# Patient Record
Sex: MALE | Race: BLACK OR AFRICAN AMERICAN | NOT HISPANIC OR LATINO | Employment: FULL TIME | URBAN - NONMETROPOLITAN AREA
[De-identification: names, ages, dates, MRNs, and addresses within clinical notes are randomized per-mention and may not be internally consistent; named-entity substitution may affect disease eponyms.]

---

## 2023-05-30 DIAGNOSIS — Z00.00 ROUTINE GENERAL MEDICAL EXAMINATION AT A HEALTH CARE FACILITY: Primary | ICD-10-CM

## 2023-05-30 RX ORDER — FOLIC ACID 1 MG/1
1 TABLET ORAL DAILY
COMMUNITY
End: 2023-05-30 | Stop reason: SDUPTHER

## 2023-05-30 RX ORDER — FOLIC ACID 1 MG/1
1 TABLET ORAL DAILY
Qty: 90 TABLET | Refills: 1 | Status: SHIPPED | OUTPATIENT
Start: 2023-05-30 | End: 2023-08-21 | Stop reason: SDUPTHER

## 2023-06-20 NOTE — PROGRESS NOTES
Subjective   Patient ID:   Oh Espinoza is a 58 y.o. male who presents for No chief complaint on file..  HPI  Here with acute symptoms:  Symptoms x    Review of Systems  12 point review of systems negative unless stated above in HPI    There were no vitals filed for this visit.    Physical Exam  General: Alert and oriented, well nourished, no acute distress.  Lungs: Clear to auscultation, non-labored respiration.  Heart: Normal rate, regular rhythm, no murmur, gallop or edema.  Neurologic: Awake, alert, and oriented X3, CN II-XII intact.  Psychiatric: Cooperative, appropriate mood and affect.    Assessment/Plan   There are no diagnoses linked to this encounter.

## 2023-06-22 ENCOUNTER — APPOINTMENT (OUTPATIENT)
Dept: PRIMARY CARE | Facility: CLINIC | Age: 58
End: 2023-06-22
Payer: COMMERCIAL

## 2023-07-27 ENCOUNTER — TELEPHONE (OUTPATIENT)
Dept: PRIMARY CARE | Facility: CLINIC | Age: 58
End: 2023-07-27
Payer: COMMERCIAL

## 2023-07-27 DIAGNOSIS — Z00.00 ROUTINE GENERAL MEDICAL EXAMINATION AT A HEALTH CARE FACILITY: ICD-10-CM

## 2023-07-27 DIAGNOSIS — Z12.5 SCREENING PSA (PROSTATE SPECIFIC ANTIGEN): Primary | ICD-10-CM

## 2023-08-08 PROBLEM — J45.909 ASTHMA (HHS-HCC): Status: ACTIVE | Noted: 2023-08-08

## 2023-08-08 PROBLEM — E55.9 VITAMIN D DEFICIENCY: Status: ACTIVE | Noted: 2023-08-08

## 2023-08-08 PROBLEM — I26.99 PULMONARY EMBOLISM (MULTI): Status: ACTIVE | Noted: 2023-08-08

## 2023-08-08 PROBLEM — D63.8 ANEMIA, CHRONIC DISEASE: Status: ACTIVE | Noted: 2023-08-08

## 2023-08-08 NOTE — PROGRESS NOTES
Subjective   Patient ID:   Oh Espinoza is a 58 y.o. male who presents for Follow-up.  HPI  I received labs today and they look good.    Asthma:  Taking Dulera, Proventil.  No breathing problems.     Anemia of chronic disease:  Hemoglobin was normal in Aug 2023.  Has been stable the last few years.     Hx of PE:  Taking Eliquis.  Denies CP, SOB.     Vitamin D deficiency:   Taking a supplement.  This was improved in Oct 2021.      Health maintenance:  Smoking: Never a smoker.  PSA (50+): Aug 2023.  Labs: Aug 2023.  Colonoscopy (50-75): July 2019.  Influenza:     Review of Systems  12 point review of systems negative unless stated above in HPI    Vitals:    08/17/23 1537   BP: 114/75   Pulse: 68   Resp: 18   SpO2: 96%     Physical Exam  General: Alert and oriented, well nourished, no acute distress.  Lungs: Clear to auscultation, non-labored respiration.  Heart: Normal rate, regular rhythm, no murmur, gallop or edema.  Neurologic: Awake, alert, and oriented X3, CN II-XII intact.  Psychiatric: Cooperative, appropriate mood and affect.    Assessment/Plan   Labs were reviewed today.  Everything looks good!  Continue the same medications.  Chronic conditions are stable.  Call with questions or concerns.    F/u  6 months  Diagnoses and all orders for this visit:  Anemia, chronic disease  Mild intermittent asthma without complication  Pulmonary embolism without acute cor pulmonale, unspecified chronicity, unspecified pulmonary embolism type (CMS/[facility])  Vitamin D deficiency  Screening PSA (prostate specific antigen)  Routine general medical examination at a health care facility

## 2023-08-17 ENCOUNTER — OFFICE VISIT (OUTPATIENT)
Dept: PRIMARY CARE | Facility: CLINIC | Age: 58
End: 2023-08-17
Payer: COMMERCIAL

## 2023-08-17 VITALS
OXYGEN SATURATION: 96 % | DIASTOLIC BLOOD PRESSURE: 75 MMHG | WEIGHT: 257.6 LBS | HEART RATE: 68 BPM | HEIGHT: 70 IN | RESPIRATION RATE: 18 BRPM | SYSTOLIC BLOOD PRESSURE: 114 MMHG | BODY MASS INDEX: 36.88 KG/M2

## 2023-08-17 DIAGNOSIS — D63.8 ANEMIA, CHRONIC DISEASE: Primary | ICD-10-CM

## 2023-08-17 DIAGNOSIS — Z00.00 ROUTINE GENERAL MEDICAL EXAMINATION AT A HEALTH CARE FACILITY: ICD-10-CM

## 2023-08-17 DIAGNOSIS — I26.99 PULMONARY EMBOLISM WITHOUT ACUTE COR PULMONALE, UNSPECIFIED CHRONICITY, UNSPECIFIED PULMONARY EMBOLISM TYPE (MULTI): ICD-10-CM

## 2023-08-17 DIAGNOSIS — E55.9 VITAMIN D DEFICIENCY: ICD-10-CM

## 2023-08-17 DIAGNOSIS — Z12.5 SCREENING PSA (PROSTATE SPECIFIC ANTIGEN): ICD-10-CM

## 2023-08-17 DIAGNOSIS — J45.20 MILD INTERMITTENT ASTHMA WITHOUT COMPLICATION (HHS-HCC): ICD-10-CM

## 2023-08-17 PROBLEM — R19.5 OCCULT BLOOD POSITIVE STOOL: Status: ACTIVE | Noted: 2023-08-17

## 2023-08-17 PROBLEM — R05.9 COUGH: Status: RESOLVED | Noted: 2023-08-17 | Resolved: 2023-08-17

## 2023-08-17 PROBLEM — D12.8 TUBULOVILLOUS ADENOMA OF RECTUM: Status: ACTIVE | Noted: 2023-08-17

## 2023-08-17 PROBLEM — R06.02 SHORTNESS OF BREATH: Status: ACTIVE | Noted: 2023-08-17

## 2023-08-17 PROBLEM — K63.5 POLYP, COLONIC: Status: ACTIVE | Noted: 2023-08-17

## 2023-08-17 PROBLEM — I87.2 CHRONIC VENOUS INSUFFICIENCY: Status: ACTIVE | Noted: 2023-08-17

## 2023-08-17 PROBLEM — A04.8 HELICOBACTER PYLORI INFECTION: Status: ACTIVE | Noted: 2023-08-17

## 2023-08-17 PROBLEM — L40.9 PSORIASIS: Status: ACTIVE | Noted: 2023-08-17

## 2023-08-17 PROBLEM — T78.40XA ALLERGIC REACTION: Status: RESOLVED | Noted: 2023-08-17 | Resolved: 2023-08-17

## 2023-08-17 PROBLEM — R94.6 ABNORMAL THYROID FUNCTION TEST: Status: ACTIVE | Noted: 2023-08-17

## 2023-08-17 PROBLEM — R00.1 SINUS BRADYCARDIA: Status: ACTIVE | Noted: 2023-08-17

## 2023-08-17 PROBLEM — D64.9 LOW HEMOGLOBIN: Status: ACTIVE | Noted: 2023-08-17

## 2023-08-17 PROBLEM — E66.9 MODERATE OBESITY: Status: ACTIVE | Noted: 2023-08-17

## 2023-08-17 PROBLEM — R60.0 LEG EDEMA: Status: RESOLVED | Noted: 2023-08-17 | Resolved: 2023-08-17

## 2023-08-17 PROBLEM — R05.3 CHRONIC COUGH: Status: RESOLVED | Noted: 2023-08-17 | Resolved: 2023-08-17

## 2023-08-17 PROBLEM — L03.115 CELLULITIS OF RIGHT LOWER EXTREMITY WITHOUT FOOT: Status: RESOLVED | Noted: 2023-08-17 | Resolved: 2023-08-17

## 2023-08-17 PROBLEM — C80.1: Status: RESOLVED | Noted: 2023-08-17 | Resolved: 2023-08-17

## 2023-08-17 PROBLEM — J20.8 ACUTE BRONCHITIS DUE TO OTHER SPECIFIED ORGANISMS: Status: RESOLVED | Noted: 2023-08-17 | Resolved: 2023-08-17

## 2023-08-17 PROBLEM — R79.83 HOMOCYSTINEMIA: Status: ACTIVE | Noted: 2023-08-17

## 2023-08-17 PROBLEM — L03.90 CELLULITIS: Status: RESOLVED | Noted: 2023-08-17 | Resolved: 2023-08-17

## 2023-08-17 PROBLEM — R91.8 PULMONARY NODULES: Status: ACTIVE | Noted: 2023-08-17

## 2023-08-17 PROBLEM — C80.1: Status: ACTIVE | Noted: 2023-08-17

## 2023-08-17 PROBLEM — L03.012 PARONYCHIA OF LEFT MIDDLE FINGER: Status: ACTIVE | Noted: 2023-08-17

## 2023-08-17 PROBLEM — J20.9 ACUTE BRONCHITIS: Status: RESOLVED | Noted: 2023-08-17 | Resolved: 2023-08-17

## 2023-08-17 PROBLEM — K64.9 HEMORRHOIDS: Status: RESOLVED | Noted: 2023-08-17 | Resolved: 2023-08-17

## 2023-08-17 PROBLEM — E66.8 MODERATE OBESITY: Status: ACTIVE | Noted: 2023-08-17

## 2023-08-17 PROBLEM — J45.30 MILD PERSISTENT ASTHMA WITHOUT COMPLICATION (HHS-HCC): Status: ACTIVE | Noted: 2023-08-17

## 2023-08-17 PROBLEM — D72.829 LEUCOCYTOSIS: Status: ACTIVE | Noted: 2023-08-17

## 2023-08-17 PROBLEM — D12.6 TUBULOVILLOUS ADENOMA OF COLON: Status: ACTIVE | Noted: 2023-08-17

## 2023-08-17 PROBLEM — I87.2 CHRONIC VENOUS STASIS DERMATITIS: Status: ACTIVE | Noted: 2023-08-17

## 2023-08-17 PROBLEM — D68.59 HYPERCOAGULABLE STATE (MULTI): Status: ACTIVE | Noted: 2023-08-17

## 2023-08-17 PROBLEM — D36.9 TUBULAR ADENOMA: Status: ACTIVE | Noted: 2023-08-17

## 2023-08-17 PROCEDURE — 99213 OFFICE O/P EST LOW 20 MIN: CPT | Performed by: PHYSICIAN ASSISTANT

## 2023-08-17 PROCEDURE — 1036F TOBACCO NON-USER: CPT | Performed by: PHYSICIAN ASSISTANT

## 2023-08-17 RX ORDER — APIXABAN 5 MG/1
5 TABLET, FILM COATED ORAL 2 TIMES DAILY
COMMUNITY
End: 2023-08-21 | Stop reason: SDUPTHER

## 2023-08-17 RX ORDER — ACETAMINOPHEN 500 MG
TABLET ORAL DAILY
COMMUNITY

## 2023-08-17 RX ORDER — UBIDECARENONE 75 MG
1 CAPSULE ORAL 2 TIMES DAILY
COMMUNITY
Start: 2016-07-14

## 2023-08-17 RX ORDER — ALBUTEROL SULFATE 90 UG/1
1 AEROSOL, METERED RESPIRATORY (INHALATION) EVERY 6 HOURS PRN
COMMUNITY
Start: 2016-06-23

## 2023-08-17 RX ORDER — MOMETASONE FUROATE AND FORMOTEROL FUMARATE DIHYDRATE 200; 5 UG/1; UG/1
2 AEROSOL RESPIRATORY (INHALATION) 2 TIMES DAILY
COMMUNITY
Start: 2020-06-10 | End: 2023-08-29 | Stop reason: SDUPTHER

## 2023-08-17 ASSESSMENT — PAIN SCALES - GENERAL: PAINLEVEL: 0-NO PAIN

## 2023-08-17 ASSESSMENT — PATIENT HEALTH QUESTIONNAIRE - PHQ9
SUM OF ALL RESPONSES TO PHQ9 QUESTIONS 1 AND 2: 0
2. FEELING DOWN, DEPRESSED OR HOPELESS: NOT AT ALL
1. LITTLE INTEREST OR PLEASURE IN DOING THINGS: NOT AT ALL

## 2023-08-21 DIAGNOSIS — Z00.00 ROUTINE GENERAL MEDICAL EXAMINATION AT A HEALTH CARE FACILITY: ICD-10-CM

## 2023-08-21 DIAGNOSIS — I26.99 PULMONARY EMBOLISM WITHOUT ACUTE COR PULMONALE, UNSPECIFIED CHRONICITY, UNSPECIFIED PULMONARY EMBOLISM TYPE (MULTI): ICD-10-CM

## 2023-08-21 RX ORDER — FOLIC ACID 1 MG/1
1 TABLET ORAL DAILY
Qty: 90 TABLET | Refills: 1 | Status: SHIPPED | OUTPATIENT
Start: 2023-08-21 | End: 2023-08-29 | Stop reason: SDUPTHER

## 2023-08-29 DIAGNOSIS — Z00.00 ROUTINE GENERAL MEDICAL EXAMINATION AT A HEALTH CARE FACILITY: ICD-10-CM

## 2023-08-29 DIAGNOSIS — J45.20 MILD INTERMITTENT ASTHMA WITHOUT COMPLICATION (HHS-HCC): ICD-10-CM

## 2023-08-29 RX ORDER — MOMETASONE FUROATE AND FORMOTEROL FUMARATE DIHYDRATE 200; 5 UG/1; UG/1
2 AEROSOL RESPIRATORY (INHALATION) 2 TIMES DAILY
Qty: 13 G | Refills: 0 | Status: SHIPPED | OUTPATIENT
Start: 2023-08-29

## 2023-08-29 RX ORDER — FOLIC ACID 1 MG/1
1 TABLET ORAL DAILY
Qty: 90 TABLET | Refills: 1 | Status: SHIPPED | OUTPATIENT
Start: 2023-08-29 | End: 2024-05-29 | Stop reason: SDUPTHER

## 2023-09-01 DIAGNOSIS — I26.99 PULMONARY EMBOLISM WITHOUT ACUTE COR PULMONALE, UNSPECIFIED CHRONICITY, UNSPECIFIED PULMONARY EMBOLISM TYPE (MULTI): ICD-10-CM

## 2023-09-25 DIAGNOSIS — I26.99 PULMONARY EMBOLISM WITHOUT ACUTE COR PULMONALE, UNSPECIFIED CHRONICITY, UNSPECIFIED PULMONARY EMBOLISM TYPE (MULTI): ICD-10-CM

## 2023-11-15 DIAGNOSIS — I26.99 PULMONARY EMBOLISM WITHOUT ACUTE COR PULMONALE, UNSPECIFIED CHRONICITY, UNSPECIFIED PULMONARY EMBOLISM TYPE (MULTI): ICD-10-CM

## 2024-02-20 ENCOUNTER — OFFICE VISIT (OUTPATIENT)
Dept: PRIMARY CARE | Facility: CLINIC | Age: 59
End: 2024-02-20
Payer: COMMERCIAL

## 2024-02-20 VITALS
HEART RATE: 54 BPM | HEIGHT: 70 IN | RESPIRATION RATE: 18 BRPM | BODY MASS INDEX: 37.19 KG/M2 | OXYGEN SATURATION: 97 % | SYSTOLIC BLOOD PRESSURE: 136 MMHG | DIASTOLIC BLOOD PRESSURE: 78 MMHG | WEIGHT: 259.8 LBS

## 2024-02-20 DIAGNOSIS — J45.20 MILD INTERMITTENT ASTHMA WITHOUT COMPLICATION (HHS-HCC): ICD-10-CM

## 2024-02-20 DIAGNOSIS — Z00.00 HEALTHCARE MAINTENANCE: ICD-10-CM

## 2024-02-20 DIAGNOSIS — Z12.11 COLON CANCER SCREENING: ICD-10-CM

## 2024-02-20 DIAGNOSIS — E55.9 VITAMIN D DEFICIENCY: ICD-10-CM

## 2024-02-20 DIAGNOSIS — Z86.711 HISTORY OF PULMONARY EMBOLISM: ICD-10-CM

## 2024-02-20 PROBLEM — R19.5 OCCULT BLOOD POSITIVE STOOL: Status: RESOLVED | Noted: 2023-08-17 | Resolved: 2024-02-20

## 2024-02-20 PROBLEM — R06.02 SHORTNESS OF BREATH: Status: RESOLVED | Noted: 2023-08-17 | Resolved: 2024-02-20

## 2024-02-20 PROBLEM — D72.829 LEUCOCYTOSIS: Status: RESOLVED | Noted: 2023-08-17 | Resolved: 2024-02-20

## 2024-02-20 PROBLEM — I87.2 CHRONIC VENOUS INSUFFICIENCY: Status: RESOLVED | Noted: 2023-08-17 | Resolved: 2024-02-20

## 2024-02-20 PROBLEM — J45.30 MILD PERSISTENT ASTHMA WITHOUT COMPLICATION (HHS-HCC): Status: RESOLVED | Noted: 2023-08-17 | Resolved: 2024-02-20

## 2024-02-20 PROBLEM — L03.012 PARONYCHIA OF LEFT MIDDLE FINGER: Status: RESOLVED | Noted: 2023-08-17 | Resolved: 2024-02-20

## 2024-02-20 PROBLEM — D68.59 HYPERCOAGULABLE STATE (MULTI): Status: RESOLVED | Noted: 2023-08-17 | Resolved: 2024-02-20

## 2024-02-20 PROBLEM — D64.9 LOW HEMOGLOBIN: Status: RESOLVED | Noted: 2023-08-17 | Resolved: 2024-02-20

## 2024-02-20 PROCEDURE — 99213 OFFICE O/P EST LOW 20 MIN: CPT | Performed by: INTERNAL MEDICINE

## 2024-02-20 PROCEDURE — 1036F TOBACCO NON-USER: CPT | Performed by: INTERNAL MEDICINE

## 2024-02-20 ASSESSMENT — PATIENT HEALTH QUESTIONNAIRE - PHQ9
SUM OF ALL RESPONSES TO PHQ9 QUESTIONS 1 AND 2: 0
1. LITTLE INTEREST OR PLEASURE IN DOING THINGS: NOT AT ALL
2. FEELING DOWN, DEPRESSED OR HOPELESS: NOT AT ALL

## 2024-02-20 ASSESSMENT — ENCOUNTER SYMPTOMS: WHEEZING: 1

## 2024-02-20 ASSESSMENT — PAIN SCALES - GENERAL: PAINLEVEL: 0-NO PAIN

## 2024-02-20 NOTE — PROGRESS NOTES
"Patient ID:   Oh Espinoza is a 58 y.o. male with PMH remarkable for asthma, pulmonary embolism in 2016, vitamin d deficiency, anemia of chronic disease, who presents to the office today for Follow-up.    HEALTH MAINTENANCE: Follow Up  Smoking: Never a Smoker  Labs: 8/11/2023: Total cholesterol 194, , HDL 48,  PSA: 0.25 on 8/11/2023 (these records are scanned in)  Colonoscopy (45-75): 7/2019 -> DUE on/after 7/2024 -- will place referral  --- brother passed away from colon cancer.   Lung cancer screening (55-80 + 30 pack year + smoking/quit in last 15 years): 9/7/2017 showing stable nodules up to 3mm for ~14m and it was felt that additional FU was not necessary.     SOCIAL HISTORY:  Social History     Tobacco Use    Smoking status: Never    Smokeless tobacco: Never   Substance Use Topics    Alcohol use: Never     Social History     Tobacco Use    Smoking status: Never    Smokeless tobacco: Never   Substance Use Topics    Alcohol use: Never        Review of Systems   Respiratory:  Positive for wheezing.    All other systems reviewed and are negative.    Visit Vitals  /78   Pulse 54   Resp 18   Ht 1.778 m (5' 10\")   Wt 118 kg (259 lb 12.8 oz)   SpO2 97%   BMI 37.28 kg/m²   Smoking Status Never   BSA 2.41 m²      ALLERGIES:  Allergies   Allergen Reactions    Shrimp Swelling    Ragweed Pollen Other    [place] Swelling      Physical Exam  Vitals reviewed.   Constitutional:       General: He is not in acute distress.     Appearance: Normal appearance. He is overweight. He is not ill-appearing.   HENT:      Head: Normocephalic and atraumatic.      Right Ear: Tympanic membrane and external ear normal.      Left Ear: Tympanic membrane and external ear normal.      Nose: Nose normal.      Mouth/Throat:      Mouth: Mucous membranes are moist.      Pharynx: Oropharynx is clear.   Eyes:      Conjunctiva/sclera: Conjunctivae normal.      Pupils: Pupils are equal, round, and reactive to light.   Cardiovascular:      " Rate and Rhythm: Normal rate and regular rhythm.      Heart sounds: Normal heart sounds. No murmur heard.  Pulmonary:      Effort: Pulmonary effort is normal. No respiratory distress.      Breath sounds: Wheezing present.   Abdominal:      General: There is no distension.      Palpations: Abdomen is soft. There is no mass.      Tenderness: There is no abdominal tenderness.   Musculoskeletal:         General: Normal range of motion.      Cervical back: Normal range of motion and neck supple.   Skin:     General: Skin is warm and dry.   Neurological:      General: No focal deficit present.      Mental Status: He is alert and oriented to person, place, and time.      Sensory: No sensory deficit.      Motor: No weakness.      Coordination: Coordination normal.      Gait: Gait normal.   Psychiatric:         Mood and Affect: Mood normal.         Behavior: Behavior normal.       MEDICATIONS:  Current Outpatient Medications   Medication Instructions    albuterol (ProAir HFA) 90 mcg/actuation inhaler 1 puff, inhalation, Every 6 hours PRN    apixaban (ELIQUIS) 5 mg, oral, 2 times daily    cholecalciferol (Vitamin D3) 50 mcg (2,000 unit) capsule oral, Daily    cyanocobalamin (Vitamin B-12) 500 mcg tablet 1 tablet, oral, 2 times daily    folic acid (FOLVITE) 1 mg, oral, Daily    mometasone-formoterol (Dulera) 200-5 mcg/actuation inhaler 2 puffs, inhalation, 2 times daily      RECENT LABS:  Lab Results   Component Value Date    WBC 5.9 08/13/2022    HGB 13.5 08/13/2022    HCT 41.2 08/13/2022     08/13/2022    CHOL 193 08/13/2022    TRIG 66 08/13/2022    HDL 45.0 08/13/2022    ALT 20 08/13/2022    AST 17 08/13/2022     08/13/2022    K 4.2 08/13/2022     08/13/2022    CREATININE 1.04 08/13/2022    BUN 19 08/13/2022    CO2 29 08/13/2022    TSH 1.85 11/08/2019    HGBA1C 5.9 11/08/2019     ASSESSMENT AND PLAN:  Assessment/Plan   Oh was seen today for follow-up.  Diagnoses and all orders for this  visit:  Healthcare maintenance  Comments:  - FU in 4m or sooner if needed  - we will order labs at next visit  Orders:  -     Full code  Colon cancer screening  Comments:  - due 2024. referral placed today for this.  - brother passed away from colon cancer  Orders:  -     Colonoscopy Screening; High Risk Patient; brother  of colon cancer; Future  Mild intermittent asthma without complication  Comments:  - c/w dulera BID  - PRN albuterol  History of pulmonary embolism  Comments:  - c/w eliqus 5mg BID  Vitamin D deficiency  Comments:  - c/w supplementation   -------------------  Written by Hannah Johnson RN, acting as a scribe for Dr. Almazna. This note accurately reflects the work and decisions made by Dr. Almanza.     I, Dr. Almanza, attest all medical record entries made by the scribe were under my direction and were personally dictated by me. I have reviewed the chart and agree that the record accurately reflects my performance of the history, physical exam, and assessment and plan.

## 2024-05-29 DIAGNOSIS — Z00.00 ROUTINE GENERAL MEDICAL EXAMINATION AT A HEALTH CARE FACILITY: ICD-10-CM

## 2024-05-29 RX ORDER — FOLIC ACID 1 MG/1
1 TABLET ORAL DAILY
Qty: 90 TABLET | Refills: 0 | Status: SHIPPED | OUTPATIENT
Start: 2024-05-29

## 2024-06-26 ENCOUNTER — APPOINTMENT (OUTPATIENT)
Dept: PRIMARY CARE | Facility: CLINIC | Age: 59
End: 2024-06-26
Payer: COMMERCIAL

## 2024-08-06 ENCOUNTER — APPOINTMENT (OUTPATIENT)
Dept: PRIMARY CARE | Facility: CLINIC | Age: 59
End: 2024-08-06
Payer: COMMERCIAL

## 2024-08-06 VITALS
WEIGHT: 224 LBS | HEIGHT: 70 IN | BODY MASS INDEX: 32.07 KG/M2 | SYSTOLIC BLOOD PRESSURE: 124 MMHG | RESPIRATION RATE: 18 BRPM | HEART RATE: 53 BPM | DIASTOLIC BLOOD PRESSURE: 78 MMHG | OXYGEN SATURATION: 97 %

## 2024-08-06 DIAGNOSIS — E55.9 VITAMIN D DEFICIENCY: ICD-10-CM

## 2024-08-06 DIAGNOSIS — Z12.5 SCREENING PSA (PROSTATE SPECIFIC ANTIGEN): ICD-10-CM

## 2024-08-06 DIAGNOSIS — D63.8 ANEMIA, CHRONIC DISEASE: ICD-10-CM

## 2024-08-06 DIAGNOSIS — Z63.0 MARITAL PROBLEM: ICD-10-CM

## 2024-08-06 DIAGNOSIS — I26.99 PULMONARY EMBOLISM WITHOUT ACUTE COR PULMONALE, UNSPECIFIED CHRONICITY, UNSPECIFIED PULMONARY EMBOLISM TYPE (MULTI): ICD-10-CM

## 2024-08-06 DIAGNOSIS — J45.20 MILD INTERMITTENT ASTHMA WITHOUT COMPLICATION (HHS-HCC): ICD-10-CM

## 2024-08-06 DIAGNOSIS — Z13.220 LIPID SCREENING: ICD-10-CM

## 2024-08-06 DIAGNOSIS — R46.89 ABNORMAL BEHAVIOR: ICD-10-CM

## 2024-08-06 DIAGNOSIS — Z00.00 HEALTH CARE MAINTENANCE: ICD-10-CM

## 2024-08-06 PROCEDURE — 99214 OFFICE O/P EST MOD 30 MIN: CPT | Performed by: INTERNAL MEDICINE

## 2024-08-06 PROCEDURE — 3008F BODY MASS INDEX DOCD: CPT | Performed by: INTERNAL MEDICINE

## 2024-08-06 PROCEDURE — 1036F TOBACCO NON-USER: CPT | Performed by: INTERNAL MEDICINE

## 2024-08-06 RX ORDER — SOD SULF/POT CHLORIDE/MAG SULF 1.479 G
1 TABLET ORAL DAILY
COMMUNITY
Start: 2024-06-25

## 2024-08-06 SDOH — SOCIAL STABILITY - SOCIAL INSECURITY: PROBLEMS IN RELATIONSHIP WITH SPOUSE OR PARTNER: Z63.0

## 2024-08-06 ASSESSMENT — PAIN SCALES - GENERAL: PAINLEVEL: 0-NO PAIN

## 2024-08-06 ASSESSMENT — PATIENT HEALTH QUESTIONNAIRE - PHQ9
1. LITTLE INTEREST OR PLEASURE IN DOING THINGS: NOT AT ALL
2. FEELING DOWN, DEPRESSED OR HOPELESS: NOT AT ALL
SUM OF ALL RESPONSES TO PHQ9 QUESTIONS 1 AND 2: 0

## 2024-08-06 NOTE — PROGRESS NOTES
Patient ID:   Oh Espinoza is a 59 y.o. male with PMH remarkable for asthma, pulmonary embolism in 2016, vitamin d deficiency, anemia of chronic disease who presents to the office today for Follow-up.    HEALTH MAINTENANCE: Follow Up  Last Office Visit: 2/20/2024 for FU. Colonoscopy screening was placed.   Labs: 8/11/2023: Total cholesterol 194, , HDL 48 --> DUE  PSA: 0.25 on 8/11/2023 (these records are scanned in) --> DUE  Colonoscopy (45-75): 7/25/2024 with Dr Gerry MD showing normal colonoscopy and rec repeat in 5 yrs.   --- brother passed away from colon cancer.   Lung cancer screening (55-80 + 30 pack year + smoking/quit in last 15 years): 9/7/2017 showing stable nodules up to 3mm for ~14m and it was felt that additional FU was not necessary.     Started taking different vitamins - [employer] Mushroom  Stated that he is retiring on Friday from work - has worked there x 39 yrs.  Told his wife last week that he wants a divorce. He reports that a lot has changed in his life. He has been going to [employer], praying, losing weight, reading bible, doing long fasts.  Declines going to marriage counseling. They did this in the past to help them get through some tough times and it helped.    Eating better lately and lost 36# in 6 months. Has been doing intermittent fasting and walking ~2 miles per day.  Feels like he has a hunch that he is going to win the [employer] so he has been purchasing tickets.  Wife is present and stated that he has changed and he wants to leave her now. She feels like he is bipolar or schizophrenic.    Social History     Tobacco Use    Smoking status: Never    Smokeless tobacco: Never   Substance Use Topics    Alcohol use: Never     Review of Systems   Constitutional:  Positive for activity change.   Psychiatric/Behavioral:  Positive for behavioral problems and sleep disturbance. The patient is nervous/anxious.    All other systems reviewed and are negative.    Visit Vitals  BP  "124/78 (BP Location: Left arm, Patient Position: Sitting)   Pulse 53   Resp 18   Ht 1.778 m (5' 10\")   Wt 102 kg (224 lb)   SpO2 97%   BMI 32.14 kg/m²   Smoking Status Never   BSA 2.24 m²     Physical Exam  Vitals reviewed.   Constitutional:       General: He is not in acute distress.     Appearance: Normal appearance. He is overweight. He is not ill-appearing.   HENT:      Head: Normocephalic and atraumatic.      Right Ear: Tympanic membrane and external ear normal.      Left Ear: Tympanic membrane and external ear normal.      Nose: Nose normal.      Mouth/Throat:      Mouth: Mucous membranes are moist.      Pharynx: Oropharynx is clear.   Eyes:      Conjunctiva/sclera: Conjunctivae normal.      Pupils: Pupils are equal, round, and reactive to light.   Cardiovascular:      Rate and Rhythm: Normal rate and regular rhythm.      Heart sounds: Normal heart sounds. No murmur heard.  Pulmonary:      Effort: Pulmonary effort is normal. No respiratory distress.      Breath sounds: Wheezing present.   Abdominal:      General: There is no distension.      Palpations: Abdomen is soft. There is no mass.      Tenderness: There is no abdominal tenderness.   Musculoskeletal:         General: Normal range of motion.      Cervical back: Normal range of motion and neck supple.   Skin:     General: Skin is warm and dry.   Neurological:      General: No focal deficit present.      Mental Status: He is alert and oriented to person, place, and time.      Sensory: No sensory deficit.      Motor: No weakness.      Coordination: Coordination normal.      Gait: Gait normal.   Psychiatric:         Mood and Affect: Mood normal.         Behavior: Behavior normal.       Current Outpatient Medications   Medication Instructions    albuterol (ProAir HFA) 90 mcg/actuation inhaler 1 puff, inhalation, Every 6 hours PRN    apixaban (ELIQUIS) 5 mg, oral, 2 times daily    cholecalciferol (Vitamin D3) 50 mcg (2,000 unit) capsule oral, Daily    " cyanocobalamin (Vitamin B-12) 500 mcg tablet 1 tablet, oral, Daily    folic acid (FOLVITE) 1 mg, oral, Daily    mometasone-formoterol (Dulera) 200-5 mcg/actuation inhaler 2 puffs, inhalation, 2 times daily    Sutab 1.479-0.188- 0.225 gram tablet 1 tablet, oral, Daily      Lab Results   Component Value Date    WBC 5.9 08/13/2022    HGB 13.5 08/13/2022    HCT 41.2 08/13/2022     08/13/2022    CHOL 193 08/13/2022    TRIG 66 08/13/2022    HDL 45.0 08/13/2022    ALT 20 08/13/2022    AST 17 08/13/2022     08/13/2022    K 4.2 08/13/2022     08/13/2022    CREATININE 1.04 08/13/2022    BUN 19 08/13/2022    CO2 29 08/13/2022    TSH 1.85 11/08/2019    HGBA1C 5.9 11/08/2019     Problem List Items Addressed This Visit             ICD-10-CM    Anemia, chronic disease D63.8    Relevant Orders    CBC and Auto Differential    Comprehensive Metabolic Panel    Urinalysis with Reflex Microscopic    Asthma ([facility]) J45.909     - c/w inhaler         Vitamin D deficiency E55.9     - c/w vitamin d supplementation         Relevant Orders    Vitamin D 25-Hydroxy,Total (for eval of Vitamin D levels)    Pulmonary embolism (Multi) I26.99     From 2016  - pt's sister passed away from bilateral PE in the past  - c/w eliqus 5mg BID         Health care maintenance Z00.00    Relevant Orders    CBC and Auto Differential    Comprehensive Metabolic Panel    Lipid Panel    TSH with reflex to Free T4 if abnormal    Vitamin D 25-Hydroxy,Total (for eval of Vitamin D levels)    Vitamin B12    Urinalysis with Reflex Microscopic    Marital problem Z63.0     - will refer to marriage counselor for an evaluation and treatment.          Relevant Orders    Referral to Psychiatry    Abnormal behavior R46.89     - will refer to marriage counselor for an evaluation and treatment.          Relevant Orders    Referral to Psychiatry     Other Visit Diagnoses         Codes    Lipid screening     Z13.220    Relevant Orders    Lipid Panel    Screening  PSA (prostate specific antigen)     Z12.5    Relevant Orders    Prostate Specific Antigen, Screen            --------------------  Written by Hannah Johnson RN, acting as a scribe for Dr. Almanza. This note accurately reflects the work and decisions made by Dr. Almanza.     I, Dr. Almanza, attest all medical record entries made by the scribe were under my direction and were personally dictated by me. I have reviewed the chart and agree that the record accurately reflects my performance of the history, physical exam, and assessment and plan.

## 2024-08-06 NOTE — PATIENT INSTRUCTIONS
It was nice to see you in the office today!  As discussed during our visit...     Please have your blood drawn in the next 1-2 weeks.   You need to be FASTING for 12 hours prior to blood draw.  You may have BLACK coffee, BLACK tea and/or water at any time during the fasting time.  We will contact you with the results of your blood work and any necessary adjustments to your plan of care.  If you do not hear from us within 3-5 days of having your blood drawn, please call the [place] office at [phone].     The two closest Outpatient Lab's to this office is:  [facility]  [address] [address]    Hours:   Monday through Friday 7:30am - 4:30pm (Closed 12:30-1pm for lunch)     Or you can go to ...  [facility]  [address]  [place] [address] [phone]    Hours:   Monday through Friday 7:30am - 4:30pm (Closed 12:30-1pm for lunch)   Saturday 8am - 12pm    Website to confirm hours and location OR look up more locations ... [phone]

## 2024-08-09 PROBLEM — R46.89 ABNORMAL BEHAVIOR: Status: ACTIVE | Noted: 2024-08-09

## 2024-08-09 PROBLEM — Z63.0 MARITAL PROBLEM: Status: ACTIVE | Noted: 2024-08-09

## 2024-08-09 ASSESSMENT — ENCOUNTER SYMPTOMS
NERVOUS/ANXIOUS: 1
SLEEP DISTURBANCE: 1
ACTIVITY CHANGE: 1

## 2025-02-20 ENCOUNTER — TELEPHONE (OUTPATIENT)
Dept: PRIMARY CARE | Facility: CLINIC | Age: 60
End: 2025-02-20
Payer: COMMERCIAL

## 2025-02-20 NOTE — TELEPHONE ENCOUNTER
"Oh's daughter called with concerns about his behavior. He retired early from his job that he loved, joined an online bible club that he is paying for, he is accusing his wife of stealing his underwear and other random things. He is sitting around just watching facebook reals all day which is very out of character. He says things like \"he is the chosen one\" and that he will win it all. He stopped taking all his medications because he believes he can control it all with diet.   At his last appointment he expressed that he wanted a divorce from his wife which he did file for. He promised you that he would go to therapy and he is not seeking treatment.    His daughter Lyndsey lives out of state and is not on his communication paperwork but she wanted to express her concerns about her dads drastic behavioral changes.  "